# Patient Record
Sex: MALE | Race: WHITE | Employment: UNEMPLOYED | URBAN - METROPOLITAN AREA
[De-identification: names, ages, dates, MRNs, and addresses within clinical notes are randomized per-mention and may not be internally consistent; named-entity substitution may affect disease eponyms.]

---

## 2022-07-26 ENCOUNTER — PREP FOR PROCEDURE (OUTPATIENT)
Dept: ADMINISTRATIVE | Age: 62
End: 2022-07-26

## 2022-07-26 RX ORDER — SODIUM CHLORIDE 0.9 % (FLUSH) 0.9 %
5-40 SYRINGE (ML) INJECTION EVERY 12 HOURS SCHEDULED
OUTPATIENT
Start: 2022-07-26

## 2022-07-26 RX ORDER — SODIUM CHLORIDE 9 MG/ML
INJECTION, SOLUTION INTRAVENOUS PRN
OUTPATIENT
Start: 2022-07-26

## 2022-07-26 RX ORDER — SODIUM CHLORIDE 0.9 % (FLUSH) 0.9 %
5-40 SYRINGE (ML) INJECTION PRN
OUTPATIENT
Start: 2022-07-26

## 2022-08-19 RX ORDER — CELECOXIB 200 MG/1
200 CAPSULE ORAL 2 TIMES DAILY
COMMUNITY

## 2022-08-19 RX ORDER — LISINOPRIL 10 MG/1
10 TABLET ORAL DAILY
COMMUNITY

## 2022-08-19 RX ORDER — ACETAMINOPHEN 500 MG
500 TABLET ORAL DAILY PRN
COMMUNITY

## 2022-08-19 RX ORDER — PROMETHAZINE HYDROCHLORIDE 25 MG/1
TABLET ORAL EVERY 6 HOURS PRN
COMMUNITY

## 2022-08-19 RX ORDER — ASPIRIN 81 MG/1
81 TABLET ORAL DAILY
COMMUNITY
End: 2022-08-19

## 2022-08-19 RX ORDER — FLUTICASONE PROPIONATE 50 MCG
1 SPRAY, SUSPENSION (ML) NASAL DAILY PRN
COMMUNITY

## 2022-08-19 RX ORDER — NAPROXEN SODIUM 220 MG
220 TABLET ORAL DAILY PRN
COMMUNITY

## 2022-08-19 NOTE — PERIOP NOTE
Patient verified name, , and procedure. Type: 1a; abbreviated assessment per anesthesia guidelines    Labs per anesthesia: None per protocol    Instructed pt that they will be notified the day before their procedure by the GI Lab for time of arrival if their procedure is [place] and Pre-op for [facility] [address]. Arrival times should be called by 5 pm. If no phone is received the patient should contact their respective hospital. The GI lab telephone number is [phone] and ES Pre-op is [phone]. Follow diet and prep instructions per office including NPO status. If patient has NOT received instructions from office patient is advised to call surgeon office, verbalizes understanding. Bath or shower the night before and the am of surgery with non-mositurizing soap. No lotions, oils, powders, cologne on skin. No make up, eye make up or jewelry. Wear loose fitting comfortable, clean clothing. Must have adult present in building the entire time . Medications for the day of procedure: NONE, patient to hold: NONE    The following discharge instructions reviewed with patient: medication given during procedure may cause drowsiness for several hours, therefore, do not drive or operate machinery for remainder of the day. You may not drink alcohol on the day of your procedure, please resume regular diet and activity unless otherwise directed. You may experience abdominal distention for several hours that is relieved by the passage of gas. Contact your physician if you have any of the following: fever or chills, severe abdominal pain or excessive amount of bleeding or a large amount when having a bowel movement.  Occasional specks of blood with bowel movement would not be unusual.

## 2022-08-23 ENCOUNTER — ANESTHESIA EVENT (OUTPATIENT)
Dept: ENDOSCOPY | Age: 62
End: 2022-08-23
Payer: COMMERCIAL

## 2022-08-23 NOTE — PROGRESS NOTES
Spoke with patient and confirmed arrival time of 0700. Confirmed arrival time, location and  policy.

## 2022-08-24 ENCOUNTER — ANESTHESIA (OUTPATIENT)
Dept: ENDOSCOPY | Age: 62
End: 2022-08-24
Payer: COMMERCIAL

## 2022-08-24 ENCOUNTER — HOSPITAL ENCOUNTER (OUTPATIENT)
Age: 62
Setting detail: OUTPATIENT SURGERY
Discharge: HOME | End: 2022-08-24
Attending: INTERNAL MEDICINE | Admitting: INTERNAL MEDICINE
Payer: COMMERCIAL

## 2022-08-24 VITALS
DIASTOLIC BLOOD PRESSURE: 75 MMHG | SYSTOLIC BLOOD PRESSURE: 143 MMHG | OXYGEN SATURATION: 95 % | HEIGHT: 64 IN | HEART RATE: 71 BPM | BODY MASS INDEX: 46.95 KG/M2 | WEIGHT: 275 LBS | RESPIRATION RATE: 20 BRPM | TEMPERATURE: 96.8 F

## 2022-08-24 PROCEDURE — 2709999900 HC NON-CHARGEABLE SUPPLY: Performed by: INTERNAL MEDICINE

## 2022-08-24 PROCEDURE — 7100000010 HC PHASE II RECOVERY - FIRST 15 MIN: Performed by: INTERNAL MEDICINE

## 2022-08-24 PROCEDURE — 3700000000 HC ANESTHESIA ATTENDED CARE: Performed by: INTERNAL MEDICINE

## 2022-08-24 PROCEDURE — 6360000002 HC RX W HCPCS

## 2022-08-24 PROCEDURE — 2500000003 HC RX 250 WO HCPCS

## 2022-08-24 PROCEDURE — 2580000003 HC RX 258: Performed by: ANESTHESIOLOGY

## 2022-08-24 PROCEDURE — 7100000011 HC PHASE II RECOVERY - ADDTL 15 MIN: Performed by: INTERNAL MEDICINE

## 2022-08-24 PROCEDURE — 3609027000 HC COLONOSCOPY: Performed by: INTERNAL MEDICINE

## 2022-08-24 PROCEDURE — 3700000001 HC ADD 15 MINUTES (ANESTHESIA): Performed by: INTERNAL MEDICINE

## 2022-08-24 RX ORDER — SODIUM CHLORIDE 0.9 % (FLUSH) 0.9 %
5-40 SYRINGE (ML) INJECTION EVERY 12 HOURS SCHEDULED
Status: DISCONTINUED | OUTPATIENT
Start: 2022-08-24 | End: 2022-08-24 | Stop reason: HOSPADM

## 2022-08-24 RX ORDER — PROPOFOL 10 MG/ML
INJECTION, EMULSION INTRAVENOUS PRN
Status: DISCONTINUED | OUTPATIENT
Start: 2022-08-24 | End: 2022-08-24 | Stop reason: SDUPTHER

## 2022-08-24 RX ORDER — PROPOFOL 10 MG/ML
INJECTION, EMULSION INTRAVENOUS CONTINUOUS PRN
Status: DISCONTINUED | OUTPATIENT
Start: 2022-08-24 | End: 2022-08-24 | Stop reason: SDUPTHER

## 2022-08-24 RX ORDER — SODIUM CHLORIDE 0.9 % (FLUSH) 0.9 %
5-40 SYRINGE (ML) INJECTION PRN
Status: DISCONTINUED | OUTPATIENT
Start: 2022-08-24 | End: 2022-08-24 | Stop reason: HOSPADM

## 2022-08-24 RX ORDER — LIDOCAINE HYDROCHLORIDE 10 MG/ML
1 INJECTION, SOLUTION INFILTRATION; PERINEURAL
Status: DISCONTINUED | OUTPATIENT
Start: 2022-08-24 | End: 2022-08-24 | Stop reason: HOSPADM

## 2022-08-24 RX ORDER — LIDOCAINE HYDROCHLORIDE 20 MG/ML
INJECTION, SOLUTION EPIDURAL; INFILTRATION; INTRACAUDAL; PERINEURAL PRN
Status: DISCONTINUED | OUTPATIENT
Start: 2022-08-24 | End: 2022-08-24 | Stop reason: SDUPTHER

## 2022-08-24 RX ORDER — SODIUM CHLORIDE, SODIUM LACTATE, POTASSIUM CHLORIDE, CALCIUM CHLORIDE 600; 310; 30; 20 MG/100ML; MG/100ML; MG/100ML; MG/100ML
INJECTION, SOLUTION INTRAVENOUS CONTINUOUS
Status: DISCONTINUED | OUTPATIENT
Start: 2022-08-24 | End: 2022-08-24 | Stop reason: HOSPADM

## 2022-08-24 RX ORDER — SODIUM CHLORIDE, SODIUM LACTATE, POTASSIUM CHLORIDE, CALCIUM CHLORIDE 600; 310; 30; 20 MG/100ML; MG/100ML; MG/100ML; MG/100ML
INJECTION, SOLUTION INTRAVENOUS CONTINUOUS PRN
Status: DISCONTINUED | OUTPATIENT
Start: 2022-08-24 | End: 2022-08-24

## 2022-08-24 RX ORDER — DEXTROSE MONOHYDRATE 100 MG/ML
INJECTION, SOLUTION INTRAVENOUS CONTINUOUS PRN
Status: DISCONTINUED | OUTPATIENT
Start: 2022-08-24 | End: 2022-08-24 | Stop reason: HOSPADM

## 2022-08-24 RX ORDER — ONDANSETRON 2 MG/ML
4 INJECTION INTRAMUSCULAR; INTRAVENOUS
Status: DISCONTINUED | OUTPATIENT
Start: 2022-08-24 | End: 2022-08-24 | Stop reason: HOSPADM

## 2022-08-24 RX ORDER — SODIUM CHLORIDE 9 MG/ML
INJECTION, SOLUTION INTRAVENOUS PRN
Status: DISCONTINUED | OUTPATIENT
Start: 2022-08-24 | End: 2022-08-24 | Stop reason: HOSPADM

## 2022-08-24 RX ADMIN — PROPOFOL 130 MCG/KG/MIN: 10 INJECTION, EMULSION INTRAVENOUS at 08:00

## 2022-08-24 RX ADMIN — PROPOFOL 50 MG: 10 INJECTION, EMULSION INTRAVENOUS at 08:00

## 2022-08-24 RX ADMIN — PROPOFOL 30 MG: 10 INJECTION, EMULSION INTRAVENOUS at 08:01

## 2022-08-24 RX ADMIN — LIDOCAINE HYDROCHLORIDE 50 MG: 20 INJECTION, SOLUTION EPIDURAL; INFILTRATION; INTRACAUDAL; PERINEURAL at 08:00

## 2022-08-24 RX ADMIN — PROPOFOL 20 MG: 10 INJECTION, EMULSION INTRAVENOUS at 08:03

## 2022-08-24 RX ADMIN — SODIUM CHLORIDE, POTASSIUM CHLORIDE, SODIUM LACTATE AND CALCIUM CHLORIDE: 600; 310; 30; 20 INJECTION, SOLUTION INTRAVENOUS at 07:52

## 2022-08-24 ASSESSMENT — PAIN SCALES - GENERAL
PAINLEVEL_OUTOF10: 0

## 2022-08-24 ASSESSMENT — PAIN - FUNCTIONAL ASSESSMENT: PAIN_FUNCTIONAL_ASSESSMENT: 0-10

## 2022-08-24 NOTE — OP NOTE
COLONOSCOPY    DATE of PROCEDURE: 8/24/2022    INDICATIONS:  1. History of polyps    MEDICATION:  MAC      INSTRUMENT: PCF    PROCEDURE: After obtaining informed consent, the patient was placed in the left lateral position and sedated. The endoscope was advanced to the cecum where the appendiceal orifice and ileocecal valve were identified. On withdrawal, the colon was carefully visualized in a 360 degree fashion. Retroflexion was performed in the rectum. The patient was taken to the recovery area in stable condition. FINDINGS:  Anus: external hemorrhoids  Rectum: internal hemorrhoids  Sigmoid: normal  Descending Colon: normal  Transverse Colon: normal  Ascending Colon: normal  Cecum: normal    Estimated blood loss: 0-minimal     ASSESSMENT/PLAN:  1.  Repeat colonoscopy 5 years due to prior polyps    Ashish Faye MD  [facility], [place]

## 2022-08-24 NOTE — ANESTHESIA POSTPROCEDURE EVALUATION
Department of Anesthesiology  Postprocedure Note    Patient: Nicol Ye  MRN: [id number]  YOB: 1960  Date of evaluation: 8/24/2022      Procedure Summary     Date: 08/24/22 Room / Location: [facility] ENDO 03 / [facility] ENDOSCOPY    Anesthesia Start: 1908 Anesthesia Stop: 7506    Procedure: COLORECTAL CANCER SCREENING, NOT HIGH RISK/ 48 Diagnosis:       Hx of colonic polyps      (Hx of colonic polyps [Z86.010])    Surgeons: Nixon Daniel MD Responsible Provider: Lorelei Goldsmith MD    Anesthesia Type: TIVA ASA Status: 3          Anesthesia Type: TIVA    Micheline Phase I: Micheline Score: 10    Micheline Phase II: Micheline Score: 10      Anesthesia Post Evaluation    Patient location during evaluation: PACU  Patient participation: complete - patient participated  Level of consciousness: awake and alert  Airway patency: patent  Nausea: well controlled. Complications: no  Cardiovascular status: acceptable.   Respiratory status: acceptable  Hydration status: stable

## 2022-08-24 NOTE — H&P
[facility] H&P (Admission)        Date:  8/24/2022    Chief Complaint:  hx polyps    Subjective:     History of Present Illness:  Patient is a 62 y.o. being admitted for colonoscopy. PMH:  Past Medical History:   Diagnosis Date    Arthritis     celebrex    Fibromyalgia     celebrex    Hypertension     controlled with med    RACHEL (obstructive sleep apnea)     cpap used every night       PSH:  Past Surgical History:   Procedure Laterality Date    APPENDECTOMY      COLONOSCOPY      CYSTOSCOPY      to remove kidney stone    HERNIA REPAIR      TONSILLECTOMY         Allergies: Allergies   Allergen Reactions    Beef Allergy Hives and Rash       Home Medications:  Prior to Admission medications    Medication Sig Start Date End Date Taking?  Authorizing Provider   celecoxib (CELEBREX) 200 MG capsule Take 200 mg by mouth 2 times daily   Yes Historical Provider, MD   acetaminophen (TYLENOL) 500 MG tablet Take 500 mg by mouth daily as needed    Historical Provider, MD   fluticasone (FLONASE) 50 MCG/ACT nasal spray 1 spray by Nasal route daily as needed    Historical Provider, MD   lisinopril (PRINIVIL;ZESTRIL) 10 MG tablet Take 10 mg by mouth daily    Historical Provider, MD   naproxen sodium (ANAPROX) 220 MG tablet Take 220 mg by mouth daily as needed    Historical Provider, MD   promethazine (PHENERGAN) 25 MG tablet Take by mouth every 6 hours as needed    Historical Provider, MD       Hospital Medications:  Current Facility-Administered Medications   Medication Dose Route Frequency    lidocaine 1 % injection 1 mL  1 mL IntraDERmal Once PRN    lactated ringers infusion   IntraVENous Continuous    sodium chloride flush 0.9 % injection 5-40 mL  5-40 mL IntraVENous 2 times per day    sodium chloride flush 0.9 % injection 5-40 mL  5-40 mL IntraVENous PRN    0.9 % sodium chloride infusion   IntraVENous PRN    lactated ringers infusion   IntraVENous Continuous    sodium chloride flush 0.9 % injection 5-40 mL 5-40 mL IntraVENous 2 times per day    sodium chloride flush 0.9 % injection 5-40 mL  5-40 mL IntraVENous PRN    0.9 % sodium chloride infusion   IntraVENous PRN    ondansetron (ZOFRAN) injection 4 mg  4 mg IntraVENous Once PRN    glucose chewable tablet 16 g  4 tablet Oral PRN    dextrose bolus 10% 125 mL  125 mL IntraVENous PRN    Or    dextrose bolus 10% 250 mL  250 mL IntraVENous PRN    glucagon (rDNA) injection 1 mg  1 mg SubCUTAneous PRN    dextrose 10 % infusion   IntraVENous Continuous PRN     Facility-Administered Medications Ordered in Other Encounters   Medication Dose Route Frequency    lactated ringers infusion   IntraVENous Continuous PRN       Social History:  Social History     Tobacco Use    Smoking status: Former     Packs/day: 3.00     Years: 16.00     Pack years: 48.00     Types: Cigarettes     Quit date:      Years since quittin.    Smokeless tobacco: Former   Substance Use Topics    Alcohol use: Not Currently         Family History:  History reviewed. No pertinent family history. Review of Systems:  A detailed 10 system ROS is obtained, with pertinent positives as listed above. All others are negative. Objective:     Physical Exam:  Vitals:  BP (!) 192/93   Pulse 95   Temp 98.8 °F (37.1 °C) (Oral)   Resp 18   Ht 5' 4\" (1.626 m)   Wt 275 lb (124.7 kg)   SpO2 97%   BMI 47.20 kg/m²   Gen:  Pt is alert, cooperative, no acute distress  Skin: no large lesions  HEENT: MMM  Cardiovascular: Regular rate and rhythm. No murmurs, gallops, or rubs. Respiratory:  Comfortable breathing with no accessory muscle use. Clear breath sounds with no wheezes, rales, or rhonchi. GI:  Abdomen nondistended, soft, and nontender. Normal active bowel sounds. obese  Neurological:  Gross memory appears intact. Patient is alert and oriented. Psychiatric:  Mood appears appropriate with judgement intact.       Laboratory:    No results for input(s): WBC, HGB, HCT, PLT, MCV, NA, K, CL, CO2, BUN, CREA, CA, MG, GLU, ALB, TP, AML, INR, APTT in the last 72 hours. Invalid input(s): AP, SGOT, GPT, TBIL, DBIL, CBIL, LPSE, PTP    Assessment:       Active Problems:    * No active hospital problems. *  Resolved Problems:    * No resolved hospital problems. *      Plan:     Endoscopy and risks explained to the patient. Risks including reaction to sedation, cardiopulmonary events, infection, bleeding, perforation, requirement for surgery if complications should occur, death were explained to patient who expressed understanding and agreed to proceed with endoscopy.         Janell Rocha MD  [facility], [place]

## 2022-08-24 NOTE — DISCHARGE INSTRUCTIONS
Gastrointestinal Colonoscopy/Flexible Sigmoidoscopy - Lower Exam Discharge Instructions    Call Dr. Dick Grimm at [phone] for any problems or questions. Contact the doctors office for follow up appointment as directed. Medication may cause drowsiness for several hours, therefore:  Do not drive or operate machinery for reminder of the day. No alcohol today. Do not make any important or legal decisions for 24 hours. Do not sign any legal documents for 24 hours. Ordinarily, you may resume regular diet and activity after exam unless otherwise specified by your physician. Because of air put into your colon during exam, you may experience some abdominal distension, relieved by the passage of gas, for several hours. Contact your physician if you have any of the following:  Excessive amount of bleeding - large amount when having a bowel movement. Occasional specks of blood with bowel movement would not be unusual.  Severe abdominal pain  Fever or Chills        Any additional instructions:  Repeat colonoscopy in 5 years.

## 2022-08-24 NOTE — ANESTHESIA PRE PROCEDURE
Department of Anesthesiology  Preprocedure Note       Name:  Inderjit Hawkins   Age:  62 y.o.  :  1960                                          MRN:  [id number]         Date:  2022      Surgeon: Angeles Javier):  Lauren Lara MD    Procedure: Procedure(s):  COLORECTAL CANCER SCREENING, NOT HIGH RISK/ 48    Medications prior to admission:   Prior to Admission medications    Medication Sig Start Date End Date Taking? Authorizing Provider   celecoxib (CELEBREX) 200 MG capsule Take 200 mg by mouth 2 times daily   Yes Historical Provider, MD   acetaminophen (TYLENOL) 500 MG tablet Take 500 mg by mouth daily as needed    Historical Provider, MD   fluticasone (FLONASE) 50 MCG/ACT nasal spray 1 spray by Nasal route daily as needed    Historical Provider, MD   lisinopril (PRINIVIL;ZESTRIL) 10 MG tablet Take 10 mg by mouth daily    Historical Provider, MD   naproxen sodium (ANAPROX) 220 MG tablet Take 220 mg by mouth daily as needed    Historical Provider, MD   promethazine (PHENERGAN) 25 MG tablet Take by mouth every 6 hours as needed    Historical Provider, MD       Current medications:    Current Facility-Administered Medications   Medication Dose Route Frequency Provider Last Rate Last Admin    lidocaine 1 % injection 1 mL  1 mL IntraDERmal Once PRN Ej Viramontes MD        lactated ringers infusion   IntraVENous Continuous Ej Viramontes MD        sodium chloride flush 0.9 % injection 5-40 mL  5-40 mL IntraVENous 2 times per day Ej Viramontes MD        sodium chloride flush 0.9 % injection 5-40 mL  5-40 mL IntraVENous PRN Ej Viramontes MD        0.9 % sodium chloride infusion   IntraVENous PRN Ej Viramontes MD           Allergies: Allergies   Allergen Reactions    Beef Allergy Hives and Rash       Problem List:  There is no problem list on file for this patient.       Past Medical History:        Diagnosis Date    Arthritis     celebrex    Fibromyalgia     celebrex    Hypertension Left message to call back  [phone]   controlled with med    RACHEL (obstructive sleep apnea)     cpap used every night       Past Surgical History:        Procedure Laterality Date    APPENDECTOMY      COLONOSCOPY      CYSTOSCOPY      to remove kidney stone    HERNIA REPAIR      TONSILLECTOMY         Social History:    Social History     Tobacco Use    Smoking status: Former     Packs/day: 3.00     Years: 16.00     Pack years: 48.00     Types: Cigarettes     Quit date:      Years since quittin.6    Smokeless tobacco: Former   Substance Use Topics    Alcohol use: Not Currently                                Counseling given: Not Answered      Vital Signs (Current):   Vitals:    22 1512   Weight: 275 lb (124.7 kg)   Height: 5' 4\" (1.626 m)                                              BP Readings from Last 3 Encounters:   No data found for BP       NPO Status:                                                                                 BMI:   Wt Readings from Last 3 Encounters:   22 275 lb (124.7 kg)     Body mass index is 47.2 kg/m². CBC: No results found for: WBC, RBC, HGB, HCT, MCV, RDW, PLT    CMP: No results found for: NA, K, CL, CO2, BUN, CREATININE, GFRAA, AGRATIO, LABGLOM, GLUCOSE, GLU, PROT, CALCIUM, BILITOT, ALKPHOS, AST, ALT    POC Tests: No results for input(s): POCGLU, POCNA, POCK, POCCL, POCBUN, POCHEMO, POCHCT in the last 72 hours.     Coags: No results found for: PROTIME, INR, APTT    HCG (If Applicable): No results found for: PREGTESTUR, PREGSERUM, HCG, HCGQUANT     ABGs: No results found for: PHART, PO2ART, UNE6VHM, AYK2LFW, BEART, [id number]     Type & Screen (If Applicable):  No results found for: LABABO, LABRH    Drug/Infectious Status (If Applicable):  No results found for: HIV, HEPCAB    COVID-19 Screening (If Applicable): No results found for: COVID19        Anesthesia Evaluation  Patient summary reviewed and Nursing notes reviewed no history of anesthetic complications:   Airway: Mallampati: III Dental:      Comment: Crowns, fillings    Pulmonary: breath sounds clear to auscultation  (+) sleep apnea (States he cannot lay flat due to RACHEL): on CPAP,                             Cardiovascular:    (+) hypertension:,         Rhythm: regular  Rate: normal                 ROS comment: RBBB     Neuro/Psych:   (+) neuromuscular disease (fibromyalgia):,             GI/Hepatic/Renal:   (+) GERD:, morbid obesity          Endo/Other: Negative Endo/Other ROS                    Abdominal:             Vascular: negative vascular ROS. Other Findings:           Anesthesia Plan      TIVA     ASA 3       Induction: intravenous. Anesthetic plan and risks discussed with patient.                         Donnie Helm MD   8/24/2022

## (undated) DEVICE — CONNECTOR TBNG OD5-7MM O2 END DISP

## (undated) DEVICE — LUBE JELLY FOIL PACK 1.4 OZ: Brand: MEDLINE INDUSTRIES, INC.

## (undated) DEVICE — YANKAUER,BULB TIP,W/O VENT,RIGID,STERILE: Brand: MEDLINE

## (undated) DEVICE — NEEDLE SYR 18GA L1.5IN RED PLAS HUB S STL BLNT FILL W/O

## (undated) DEVICE — SYRINGE MED 3ML CLR PLAS STD N CTRL LUERLOCK TIP DISP

## (undated) DEVICE — CANNULA NSL ORAL AD FOR CAPNOFLEX CO2 O2 AIRLFE

## (undated) DEVICE — GAUZE,SPONGE,4"X4",12PLY,WOVEN,NS,LF: Brand: MEDLINE

## (undated) DEVICE — KENDALL RADIOLUCENT FOAM MONITORING ELECTRODE RECTANGULAR SHAPE: Brand: KENDALL

## (undated) DEVICE — SYRINGE MED 10ML LUERLOCK TIP W/O SFTY DISP

## (undated) DEVICE — SYRINGE, LUER SLIP, STERILE, 60ML: Brand: MEDLINE

## (undated) DEVICE — AIRLIFE™ OXYGEN TUBING 7 FEET (2.1 M) CRUSH RESISTANT OXYGEN TUBING, VINYL TIPPED: Brand: AIRLIFE™

## (undated) DEVICE — SINGLE PORT MANIFOLD: Brand: NEPTUNE 2